# Patient Record
Sex: FEMALE | Race: WHITE | NOT HISPANIC OR LATINO | URBAN - METROPOLITAN AREA
[De-identification: names, ages, dates, MRNs, and addresses within clinical notes are randomized per-mention and may not be internally consistent; named-entity substitution may affect disease eponyms.]

---

## 2025-06-30 ENCOUNTER — OFFICE VISIT (OUTPATIENT)
Dept: URGENT CARE | Age: 75
End: 2025-06-30
Payer: MEDICARE

## 2025-06-30 VITALS
SYSTOLIC BLOOD PRESSURE: 116 MMHG | BODY MASS INDEX: 38.41 KG/M2 | TEMPERATURE: 97.9 F | HEART RATE: 75 BPM | RESPIRATION RATE: 18 BRPM | OXYGEN SATURATION: 98 % | DIASTOLIC BLOOD PRESSURE: 69 MMHG | WEIGHT: 225 LBS | HEIGHT: 64 IN

## 2025-06-30 DIAGNOSIS — L03.211 FACIAL CELLULITIS: Primary | ICD-10-CM

## 2025-06-30 DIAGNOSIS — H00.012 HORDEOLUM EXTERNUM OF RIGHT LOWER EYELID: ICD-10-CM

## 2025-06-30 RX ORDER — CYANOCOBALAMIN 1000 UG/ML
INJECTION, SOLUTION INTRAMUSCULAR; SUBCUTANEOUS
COMMUNITY
Start: 2025-04-18

## 2025-06-30 RX ORDER — SULFAMETHOXAZOLE AND TRIMETHOPRIM 800; 160 MG/1; MG/1
1 TABLET ORAL 2 TIMES DAILY
Qty: 14 TABLET | Refills: 0 | Status: SHIPPED | OUTPATIENT
Start: 2025-06-30 | End: 2025-07-07

## 2025-06-30 RX ORDER — METOPROLOL TARTRATE 25 MG/1
1 TABLET, FILM COATED ORAL
COMMUNITY
Start: 2025-05-31

## 2025-06-30 NOTE — PATIENT INSTRUCTIONS
You are seen for infected stye of the right eye.  Please use moist warm compresses for the stye and continue with steroid drops.  He reports provided a prescription for Bactrim for facial cellulitis.  Please take the full course of antibiotics even if you feel completely improved.

## 2025-06-30 NOTE — PROGRESS NOTES
"Subjective   Patient ID: Lindsey Haile is a 74 y.o. female. They present today with a chief complaint of eye stye (Pt c/o stye in right eye x 3 days, red, swollen.).    History of Present Illness  Patient is a 74-year-old female who is for chief complaint of concern of infected stye of the right eye.  She has noticed a stye to the bottom of her right eye for the past 3 days.  She has tried cold compresses and some steroid drops that she had leftover for treatment at home however has noticed worsening redness down the right cheek.    Past Medical History  Allergies as of 06/30/2025 - Reviewed 06/30/2025   Allergen Reaction Noted    Psyllium husk Swelling 06/30/2025       Prescriptions Prior to Admission[1]     Medical History[2]    Surgical History[3]     reports that she has never smoked. She has never used smokeless tobacco. She reports that she does not drink alcohol and does not use drugs.    Review of Systems  ROS is negative unless otherwise stated in HPI.         Objective    Vitals:    06/30/25 1035   BP: 116/69   BP Location: Right arm   Patient Position: Sitting   BP Cuff Size: Large adult   Pulse: 75   Resp: 18   Temp: 36.6 °C (97.9 °F)   TempSrc: Oral   SpO2: 98%   Weight: 102 kg (225 lb)   Height: 1.626 m (5' 4\")     No LMP recorded.      VS: As documented in the triage note and EMR flowsheet from this visit was reviewed  General: Well appearing. No acute distress.   Eyes:  Extraocular movements grossly intact. No scleral icterus.  Hordeolum noted to the right lower lid with erythema that extends into the right cheek.  Conjunctiva of normal pharynx.  Head: Atraumatic. Normocephalic.     Neck: No meningismus. No gross masses. Full movement through range of motion  ENT: Posterior oropharynx shows no erythema, exudate or edema.  Uvula is midline without edema.  No stridor or trismus  CV: Regular rhythm. No murmurs, rubs, gallops appreciated.   Resp: Clear to auscultation bilaterally. No respiratory " distress.    Neuro: CN II-VII intact. A&O x3. Speech fluent. Alert. Moving all extremities. Ambulates with normal gait  Psych: Appropriate mood and affect for situation      Point of Care Test & Imaging Results from this visit  No results found for this visit on 06/30/25.   Imaging  No results found.    Cardiology, Vascular, and Other Imaging  No other imaging results found for the past 2 days      Diagnostic study results (if any) were reviewed by Vidya Brasher PA-C.    Assessment/Plan   Allergies, medications, history, and pertinent labs/EKGs/Imaging reviewed by Vidya Brasher PA-C.     Medical Decision Making  Patient is a 74-year-old female who presents for infected hordeolum of the right eye.  Vitals are stable.  She has been using cold compresses at home with a steroid drop that she had leftover.  On examination she has erythematous hordeolum of the right eye with erythema that extends into the right cheek.  Will treat facial cellulitis with Bactrim twice daily for the next 7 days.  Patient advised on moist warm compresses.  She can continue the steroid drops that she has at home.Patient informed of the diagnosis.  They are agreeable to the plan as discussed above.  Patient given the opportunity to ask questions.  All of the patient's questions were answered. Given precautions in which to seek attention in the emergency department. Discussed follow up with PCP or other appropriate clinician.      Orders and Diagnoses  Diagnoses and all orders for this visit:  Facial cellulitis  -     sulfamethoxazole-trimethoprim (Bactrim DS) 800-160 mg tablet; Take 1 tablet by mouth 2 times a day for 7 days.  Hordeolum externum of right lower eyelid      Medical Admin Record      Patient disposition: Home    Electronically signed by Vidya Brasher PA-C  11:22 AM           [1] (Not in a hospital admission)   [2] No past medical history on file.  [3] No past surgical history on file.